# Patient Record
Sex: FEMALE | URBAN - METROPOLITAN AREA
[De-identification: names, ages, dates, MRNs, and addresses within clinical notes are randomized per-mention and may not be internally consistent; named-entity substitution may affect disease eponyms.]

---

## 2018-05-15 NOTE — PATIENT DISCUSSION
RIGHT NASAL BROW PAIN- THIS IS PROBABLY ASSOCIATED TO PATIENT'S SINUS DISEASE AND SHOULD GO AWAY AFTER SINUS SURGERY. ALSO DISCUSSED POSSIBILITY OF TROCHLEITIS AS A CAUSE, BUT WOULD NEED TO DETERMINE THAT AFTER THE SINUS SURGERY. IF THAT IS THE CASE, COULD DO STEROID INJECTION INTO EXTRAOCULAR MUSCLE FOR INFLAMMATION. PT TO SEE ENT FOR SINUS SX FIRST, THEN TO RETURN TO US 3-4 WEEKS.

## 2019-09-30 ENCOUNTER — IMPORTED ENCOUNTER (OUTPATIENT)
Dept: URBAN - METROPOLITAN AREA CLINIC 9 | Facility: CLINIC | Age: 69
End: 2019-09-30

## 2020-03-03 ENCOUNTER — IMPORTED ENCOUNTER (OUTPATIENT)
Dept: URBAN - METROPOLITAN AREA CLINIC 9 | Facility: CLINIC | Age: 70
End: 2020-03-03

## 2020-05-18 ENCOUNTER — IMPORTED ENCOUNTER (OUTPATIENT)
Dept: URBAN - METROPOLITAN AREA CLINIC 9 | Facility: CLINIC | Age: 70
End: 2020-05-18

## 2020-05-28 ENCOUNTER — IMPORTED ENCOUNTER (OUTPATIENT)
Dept: URBAN - METROPOLITAN AREA CLINIC 9 | Facility: CLINIC | Age: 70
End: 2020-05-28

## 2020-06-04 ENCOUNTER — IMPORTED ENCOUNTER (OUTPATIENT)
Dept: URBAN - METROPOLITAN AREA CLINIC 9 | Facility: CLINIC | Age: 70
End: 2020-06-04

## 2020-06-24 ENCOUNTER — IMPORTED ENCOUNTER (OUTPATIENT)
Dept: URBAN - METROPOLITAN AREA CLINIC 9 | Facility: CLINIC | Age: 70
End: 2020-06-24

## 2020-07-27 ENCOUNTER — IMPORTED ENCOUNTER (OUTPATIENT)
Dept: URBAN - METROPOLITAN AREA CLINIC 9 | Facility: CLINIC | Age: 70
End: 2020-07-27

## 2021-09-09 ENCOUNTER — MOHS SURGERY-ROUTINE (OUTPATIENT)
Dept: URBAN - METROPOLITAN AREA CLINIC 12 | Facility: CLINIC | Age: 71
Setting detail: DERMATOLOGY
End: 2021-09-09

## 2021-09-09 DIAGNOSIS — C44.622 SQUAMOUS CELL CARCINOMA OF SKIN OF RIGHT UPPER LIMB, INCLUDING SHOULDER: ICD-10-CM

## 2021-09-09 PROCEDURE — 17311 MOHS 1 STAGE H/N/HF/G: CPT

## 2021-09-09 PROCEDURE — 13152 CMPLX RPR E/N/E/L 2.6-7.5 CM: CPT

## 2021-09-16 ENCOUNTER — SUTURE REMOVAL (OUTPATIENT)
Dept: URBAN - METROPOLITAN AREA CLINIC 12 | Facility: CLINIC | Age: 71
Setting detail: DERMATOLOGY
End: 2021-09-16

## 2021-09-16 DIAGNOSIS — D18.01 HEMANGIOMA OF SKIN AND SUBCUTANEOUS TISSUE: ICD-10-CM

## 2021-09-16 PROCEDURE — 99024 POSTOP FOLLOW-UP VISIT: CPT

## 2021-10-18 ENCOUNTER — OTHER- (OUTPATIENT)
Dept: URBAN - METROPOLITAN AREA CLINIC 12 | Facility: CLINIC | Age: 71
Setting detail: DERMATOLOGY
End: 2021-10-18

## 2021-10-18 DIAGNOSIS — D48.5 NEOPLASM OF UNCERTAIN BEHAVIOR OF SKIN: ICD-10-CM

## 2021-10-18 PROCEDURE — 99024 POSTOP FOLLOW-UP VISIT: CPT

## 2021-10-18 ASSESSMENT — VISUAL ACUITY
OS_SC: 20/400 SN
OS_SC: 20/200 SN
OS_SC: 20/200 SN
OD_SC: 20/50 SN
OD_CC: 20/40 SN
OD_CC: 20/30 SN
OD_CC: 20/40 SN
OD_SC: 20/400 SN
OD_SC: 20/25 SN
OS_CC: 20/20 SN
OD_SC: 20/400 SN
OS_SC: 20/400 SN
OS_CC: 20/40 SN
OS_CC: 20/40 SN
OD_CC: 20/25 SN
OD_SC: 20/400 SN
OD_SC: 20/40 SN
OD_CC: 20/40 SN
OS_SC: 20/60 SN
OS_CC: 20/25 SN
OS_SC: 20/60 SN

## 2021-10-18 ASSESSMENT — KERATOMETRY
OS_AXISANGLE2_DEGREES: 71
OD_AXISANGLE_DEGREES: 180
OS_K1POWER_DIOPTERS: 43.25
OS_AXISANGLE_DEGREES: 165
OD_AXISANGLE2_DEGREES: 52
OD_AXISANGLE2_DEGREES: 90
OD_K2POWER_DIOPTERS: 43.9
OS_AXISANGLE2_DEGREES: 90
OD_K2POWER_DIOPTERS: 43
OD_K2POWER_DIOPTERS: 43.25
OD_AXISANGLE_DEGREES: 142
OD_K1POWER_DIOPTERS: 43
OD_K1POWER_DIOPTERS: 43
OS_AXISANGLE_DEGREES: 161
OS_K2POWER_DIOPTERS: 43.75
OS_K2POWER_DIOPTERS: 43.5
OD_AXISANGLE2_DEGREES: 90
OS_AXISANGLE2_DEGREES: 75
OS_AXISANGLE_DEGREES: 180
OD_AXISANGLE_DEGREES: 180
OS_K1POWER_DIOPTERS: 43.25
OD_K1POWER_DIOPTERS: 43
OS_K2POWER_DIOPTERS: 43.5
OS_K1POWER_DIOPTERS: 43.5

## 2021-10-18 ASSESSMENT — TONOMETRY
OD_IOP_MMHG: 23
OD_IOP_MMHG: 16
OS_IOP_MMHG: 16
OD_IOP_MMHG: 20
OD_IOP_MMHG: 17
OS_IOP_MMHG: 18
OS_IOP_MMHG: 16
OD_IOP_MMHG: 17
OS_IOP_MMHG: 23
OD_IOP_MMHG: 16
OS_IOP_MMHG: 21

## 2022-02-17 ENCOUNTER — OTHER- (OUTPATIENT)
Dept: URBAN - METROPOLITAN AREA CLINIC 12 | Facility: CLINIC | Age: 72
Setting detail: DERMATOLOGY
End: 2022-02-17

## 2022-02-17 DIAGNOSIS — L57.8 OTHER SKIN CHANGES DUE TO CHRONIC EXPOSURE TO NONIONIZING RADIATION: ICD-10-CM

## 2022-02-17 PROCEDURE — 99213 OFFICE O/P EST LOW 20 MIN: CPT

## 2022-06-30 RX ORDER — SIMVASTATIN 20 MG
TABLET ORAL
COMMUNITY

## 2022-06-30 RX ORDER — CLOPIDOGREL BISULFATE 75 MG/1
TABLET ORAL
COMMUNITY